# Patient Record
Sex: FEMALE | Race: OTHER
[De-identification: names, ages, dates, MRNs, and addresses within clinical notes are randomized per-mention and may not be internally consistent; named-entity substitution may affect disease eponyms.]

---

## 2020-09-14 ENCOUNTER — HOSPITAL ENCOUNTER (OUTPATIENT)
Dept: HOSPITAL 54 - MSC | Age: 60
Discharge: HOME | End: 2020-09-14
Attending: INTERNAL MEDICINE
Payer: COMMERCIAL

## 2020-09-14 DIAGNOSIS — R53.83: ICD-10-CM

## 2020-09-14 DIAGNOSIS — F32.9: ICD-10-CM

## 2020-09-14 DIAGNOSIS — M79.2: Primary | ICD-10-CM

## 2020-09-14 DIAGNOSIS — G47.00: ICD-10-CM

## 2020-09-14 DIAGNOSIS — B36.0: ICD-10-CM

## 2020-10-02 ENCOUNTER — HOSPITAL ENCOUNTER (OUTPATIENT)
Dept: HOSPITAL 54 - MSC | Age: 60
Discharge: HOME | End: 2020-10-02
Attending: INTERNAL MEDICINE
Payer: COMMERCIAL

## 2020-10-02 DIAGNOSIS — R14.0: Primary | ICD-10-CM

## 2020-10-02 DIAGNOSIS — F32.9: ICD-10-CM

## 2020-10-02 DIAGNOSIS — D64.9: ICD-10-CM

## 2020-10-02 DIAGNOSIS — B36.0: ICD-10-CM

## 2020-10-02 DIAGNOSIS — R53.83: ICD-10-CM

## 2020-10-02 DIAGNOSIS — M79.2: ICD-10-CM

## 2020-10-02 DIAGNOSIS — G47.00: ICD-10-CM

## 2020-12-30 ENCOUNTER — HOSPITAL ENCOUNTER (OUTPATIENT)
Dept: HOSPITAL 54 - MSC | Age: 60
Discharge: HOME | End: 2020-12-30
Attending: INTERNAL MEDICINE
Payer: COMMERCIAL

## 2020-12-30 DIAGNOSIS — R14.0: ICD-10-CM

## 2020-12-30 DIAGNOSIS — D64.9: ICD-10-CM

## 2020-12-30 DIAGNOSIS — F32.9: ICD-10-CM

## 2020-12-30 DIAGNOSIS — G47.00: Primary | ICD-10-CM

## 2020-12-30 DIAGNOSIS — R53.83: ICD-10-CM

## 2020-12-30 DIAGNOSIS — B36.0: ICD-10-CM

## 2020-12-30 DIAGNOSIS — M79.2: ICD-10-CM

## 2020-12-30 DIAGNOSIS — Z79.899: ICD-10-CM

## 2023-04-01 ENCOUNTER — HOSPITAL ENCOUNTER (EMERGENCY)
Dept: HOSPITAL 12 - ER | Age: 63
Discharge: HOME | End: 2023-04-01
Payer: COMMERCIAL

## 2023-04-01 VITALS — WEIGHT: 143 LBS | HEIGHT: 67 IN | BODY MASS INDEX: 22.44 KG/M2

## 2023-04-01 VITALS — SYSTOLIC BLOOD PRESSURE: 144 MMHG | DIASTOLIC BLOOD PRESSURE: 74 MMHG

## 2023-04-01 DIAGNOSIS — Z79.899: ICD-10-CM

## 2023-04-01 DIAGNOSIS — S09.90XA: Primary | ICD-10-CM
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
## 2023-04-01 PROCEDURE — A4663 DIALYSIS BLOOD PRESSURE CUFF: HCPCS

## 2023-04-01 NOTE — NUR
Patient aox4, ambulating in the room independently. Family member at bedside. 

-------------------------------------------------------------------------------

Addendum: 04/01/23 at 2040 by HEATHER

-------------------------------------------------------------------------------

Patient's speech is clear. Patient denies N/V and H/A.